# Patient Record
Sex: MALE | Race: WHITE | NOT HISPANIC OR LATINO | URBAN - METROPOLITAN AREA
[De-identification: names, ages, dates, MRNs, and addresses within clinical notes are randomized per-mention and may not be internally consistent; named-entity substitution may affect disease eponyms.]

---

## 2017-05-30 ENCOUNTER — IMPORTED ENCOUNTER (OUTPATIENT)
Dept: URBAN - METROPOLITAN AREA CLINIC 1 | Facility: CLINIC | Age: 82
End: 2017-05-30

## 2017-05-30 PROBLEM — H25.813: Noted: 2017-05-30

## 2017-05-30 PROBLEM — H40.1131: Noted: 2017-05-30

## 2017-05-30 PROCEDURE — 92133 CPTRZD OPH DX IMG PST SGM ON: CPT

## 2017-05-30 PROCEDURE — 92012 INTRM OPH EXAM EST PATIENT: CPT

## 2017-05-30 NOTE — PATIENT DISCUSSION
1.  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 OCT shows no progression OU. Patient to continue with Latanoprost OU QHS. Patient advised to be compliant with gtts. 2.  Cataract OU:  Visually Significant secondary to glare discussed the risks benefits alternatives and limitations of cataract surgery. The patient stated a full understanding and a desire to proceed with the procedure. The patient will need to return for preop appointment with cataract measurements and to have any additional questions answered and start pre-operative eye drops as directed. Phaco PCL OS then OD. (Otherwise follow-up 6 mo 30 VF 24-2 OU) 3. H/o Epiretinal Membrane OD 4. H/o DM w/o DR OU Return for an appointment with Dr. Andrew David for AS/HP.

## 2017-06-27 ENCOUNTER — IMPORTED ENCOUNTER (OUTPATIENT)
Dept: URBAN - METROPOLITAN AREA CLINIC 1 | Facility: CLINIC | Age: 82
End: 2017-06-27

## 2017-06-27 PROBLEM — H25.812: Noted: 2017-06-27

## 2017-06-27 PROCEDURE — 92136 OPHTHALMIC BIOMETRY: CPT

## 2017-06-27 NOTE — PATIENT DISCUSSION
Cataract OS: Visually Significant secondary to glare discussed the risks benefits alternatives and limitations of cataract surgery. The patient stated a full understanding and a desire to proceed with the procedure. The patient will need to start pre-operative eye drops as directed. Proceed w/ phaco PCL OS w/ I stentPt understands they will need glasses post-op to achieve their best corrected vision.  Return as scheduled w/ PMG for sx OS

## 2017-07-05 ENCOUNTER — IMPORTED ENCOUNTER (OUTPATIENT)
Dept: URBAN - METROPOLITAN AREA CLINIC 1 | Facility: CLINIC | Age: 82
End: 2017-07-05

## 2017-07-06 ENCOUNTER — IMPORTED ENCOUNTER (OUTPATIENT)
Dept: URBAN - METROPOLITAN AREA CLINIC 1 | Facility: CLINIC | Age: 82
End: 2017-07-06

## 2017-07-06 PROBLEM — Z96.1: Noted: 2017-07-06

## 2017-07-06 PROCEDURE — 99024 POSTOP FOLLOW-UP VISIT: CPT

## 2017-07-06 NOTE — PATIENT DISCUSSION
POD#1 CE/IOL OS-- Combined Phaco w/ I-Stent (Standard)  doing well. Continue all 3 gtts as prescribed and until gone. Use Lotemax BID OS Prolensa Qdaily OS Ocuflox TID OS Cont Latanoprost OU QHSPost op Warnings Reiterated RTC as scheduled

## 2017-07-11 ENCOUNTER — IMPORTED ENCOUNTER (OUTPATIENT)
Dept: URBAN - METROPOLITAN AREA CLINIC 1 | Facility: CLINIC | Age: 82
End: 2017-07-11

## 2017-07-11 PROBLEM — H25.811: Noted: 2017-07-11

## 2017-07-11 PROBLEM — Z96.1: Noted: 2017-07-11

## 2017-07-11 PROCEDURE — 92136 OPHTHALMIC BIOMETRY: CPT

## 2017-07-11 NOTE — PATIENT DISCUSSION
1.  Cataract OD: Visually Significant discussed the risks benefits alternatives and limitations of cataract surgery. The patient stated a full understanding and a desire to proceed with the procedure. The patient will need to start pre-operative eye drops as directed. Proceed w/ phaco PCL ODPt understands they will need glasses post-op to achieve their best corrected vision. 2.  POW#1  CE/IOL Standard w/ I-stent OS doing well. Discontinue OcufloxContinue Lotemax/Durezol/Prednisolone BID until gone. Continue Prolensa/Ilevro/Acular QD until gone. Continue Latanoprost OU QHS. 3.  RTC as scheduled for sx OD w/ PMG.

## 2017-07-19 ENCOUNTER — IMPORTED ENCOUNTER (OUTPATIENT)
Dept: URBAN - METROPOLITAN AREA CLINIC 1 | Facility: CLINIC | Age: 82
End: 2017-07-19

## 2017-07-20 ENCOUNTER — IMPORTED ENCOUNTER (OUTPATIENT)
Dept: URBAN - METROPOLITAN AREA CLINIC 1 | Facility: CLINIC | Age: 82
End: 2017-07-20

## 2017-07-20 PROBLEM — Z96.1: Noted: 2017-07-20

## 2017-07-20 PROCEDURE — 99024 POSTOP FOLLOW-UP VISIT: CPT

## 2017-07-20 NOTE — PATIENT DISCUSSION
1. POD#1 CE/IOL Standard w/ i-Stent OD doing well. Continue all 3 gtts as prescribed and until gone. Ocuflox TIDLotemax/Durezol/Prednisolone BIDProlensa/Ilevro/Acular QDPost op Warnings Reiterated  2. POW#2  CE/IOLStandard w/ i-Stent OS doing well. Continue Lotemax/Durezol/Prednisolone BID until gone. Continue Prolensa/Ilevro/Acular QD until gone. 3.   RTC as scheduled

## 2017-08-10 ENCOUNTER — IMPORTED ENCOUNTER (OUTPATIENT)
Dept: URBAN - METROPOLITAN AREA CLINIC 1 | Facility: CLINIC | Age: 82
End: 2017-08-10

## 2017-08-10 PROBLEM — Z96.1: Noted: 2017-08-10

## 2017-08-10 PROCEDURE — 99024 POSTOP FOLLOW-UP VISIT: CPT

## 2017-08-10 NOTE — PATIENT DISCUSSION
POW#3 Phaco/ PCL OU (Standard w/ i-Stent OU) doing well. Finish PO meds per schedule D/c Latanoprost OU. Will restart if IOP elevates significantly. MRX for glasses given Return for an appointment in Jan 30 Anaya Zuniga with Dr. Tierney Mendez.

## 2017-08-29 ENCOUNTER — IMPORTED ENCOUNTER (OUTPATIENT)
Dept: URBAN - METROPOLITAN AREA CLINIC 1 | Facility: CLINIC | Age: 82
End: 2017-08-29

## 2017-08-29 PROBLEM — H53.2: Noted: 2017-08-29

## 2017-08-29 PROCEDURE — 92012 INTRM OPH EXAM EST PATIENT: CPT

## 2017-08-29 NOTE — PATIENT DISCUSSION
1. Diplopia probable fatigue related phoria- Advised patient to get new MRX. Will re-evaluate if symptoms persist. 2. POW#4 Phaco/ PCL OU (Standard w/ i-Stent OU) doing well.  Finish PO meds per schedule F/u as scheduled

## 2018-01-11 ENCOUNTER — IMPORTED ENCOUNTER (OUTPATIENT)
Dept: URBAN - METROPOLITAN AREA CLINIC 1 | Facility: CLINIC | Age: 83
End: 2018-01-11

## 2018-01-11 PROBLEM — Z79.84: Noted: 2018-01-11

## 2018-01-11 PROBLEM — H35.371: Noted: 2018-01-11

## 2018-01-11 PROBLEM — H40.1131: Noted: 2018-01-11

## 2018-01-11 PROBLEM — Z96.1: Noted: 2018-01-11

## 2018-01-11 PROBLEM — H43.813: Noted: 2018-01-11

## 2018-01-11 PROBLEM — E11.9: Noted: 2018-01-11

## 2018-01-11 PROCEDURE — 92133 CPTRZD OPH DX IMG PST SGM ON: CPT

## 2018-01-11 PROCEDURE — 92014 COMPRE OPH EXAM EST PT 1/>: CPT

## 2018-01-11 NOTE — PATIENT DISCUSSION
1.  DM Type II (Oral Meds) without sign of diabetic retinopathy and no blot heme on dilated retinal examination today OU No Macular Edema:  Discussed the pathophysiology of diabetes and its effect on the eye and risk of blindness. Stressed the importance of strong glucose control. Advised of importance of at least yearly dilated examinations but to contact us immediately for any problems or concerns. 2. Epiretinal Membrane OD - Stable Observe for change. 3. 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 Stable IOP OU. OCT shows mild progression OD no progression OS. Patient to continue with Latanoprost OU QHS. Patient advised to be compliant with gtts. 4.  Pseudophakia OU - (Standard w/ iStent) 5. PVD OU - RD precautions. Letter to PCP Return for an appointment in 6 mo 10 Vf 24-2 OU with Dr. Rivka Hahn.

## 2018-04-04 ENCOUNTER — IMPORTED ENCOUNTER (OUTPATIENT)
Dept: URBAN - METROPOLITAN AREA CLINIC 1 | Facility: CLINIC | Age: 83
End: 2018-04-04

## 2018-04-04 PROBLEM — H43.813: Noted: 2018-04-04

## 2018-04-04 PROBLEM — Z79.84: Noted: 2018-04-04

## 2018-04-04 PROBLEM — H35.371: Noted: 2018-04-04

## 2018-04-04 PROBLEM — E11.9: Noted: 2018-04-04

## 2018-04-04 PROBLEM — Z96.1: Noted: 2018-04-04

## 2018-04-04 PROBLEM — Z79.899: Noted: 2018-04-04

## 2018-04-04 PROBLEM — H40.013: Noted: 2018-04-04

## 2018-04-04 PROCEDURE — 99213 OFFICE O/P EST LOW 20 MIN: CPT

## 2018-04-04 PROCEDURE — 92083 EXTENDED VISUAL FIELD XM: CPT

## 2018-04-04 NOTE — PATIENT DISCUSSION
1.   Plaquenil without evidence of Toxicity or Plaquenil retinopathy on High Risk Medication. [id number] Sara Davis for patient to remain on Plaquenil. VF was done results were high fixation loss OS. Normal OU 2.  DM Type II without sign of diabetic retinopathy and no blot heme on dilated retinal examination today OU No Macular Edema:  Discussed the pathophysiology of diabetes and its effect on the eye and risk of blindness. Stressed the importance of strong glucose control. Advised of importance of at least yearly dilated examinations but to contact us immediately for any problems or concerns. 3. Type II diabetes controlled by oral medications. 4.  Epiretinal Membrane OD - Observe for change. 5. COAG Suspect OU: Condition was discussed with patient. Will monitor patient for progression. Fundus photos were ordered today. 6. PVD w/o Tear OU - Patient was cautioned to call our office immediately if they experience   a substantial change in their symptoms such as an increase in floaters persistent flashes loss of visual field (may appear as a shadow or a curtain) or decrease in visual acuity as these may indicate a retinal tear or detachment. 7.  Pseudophakia OU - observe8. Return as scheduled with Dr. Tierney Mendez.  (schedule patient for Plaq VF at next visit if patient decides to start Plaq)

## 2018-07-12 ENCOUNTER — IMPORTED ENCOUNTER (OUTPATIENT)
Dept: URBAN - METROPOLITAN AREA CLINIC 1 | Facility: CLINIC | Age: 83
End: 2018-07-12

## 2018-07-12 PROBLEM — H40.1131: Noted: 2018-07-12

## 2018-07-12 PROCEDURE — 92083 EXTENDED VISUAL FIELD XM: CPT

## 2018-07-12 PROCEDURE — 99213 OFFICE O/P EST LOW 20 MIN: CPT

## 2018-07-12 NOTE — PATIENT DISCUSSION
1.  Mild Open Angle Glaucoma OU (CD 0.75/0.80) - HVF shows non specific defects OU likely artifacts. IOP stable off COAG gtts (Latanoprost). Will restart if IOP elevates. Patient advised to be compliant with gtts. Condition was discussed with patient and patient understands. Will continue to monitor patient for any progression in condition. Patient was advised to call us with any problems questions or concerns. 2.  H/o Epiretinal Membrane OD - Stable Observe for change. 3. Pseudophakia OU - (Standard w/ iStent) 4. H/o PVD OU - RD precautions. 5.  H/o DM w/o DR OU Return for an appointment in 6 months 30/OCT with Dr. Anthony Morgan.

## 2018-07-12 NOTE — PATIENT DISCUSSION
H/o Epiretinal Membrane OD - Stable Observe for change. 3. Pseudophakia OU - (Standard w/ iStent) 4. H/o PVD OU - RD precautions.  5.  H/o DM w/o DR GLOVER

## 2019-01-24 ENCOUNTER — IMPORTED ENCOUNTER (OUTPATIENT)
Dept: URBAN - METROPOLITAN AREA CLINIC 1 | Facility: CLINIC | Age: 84
End: 2019-01-24

## 2019-01-24 PROBLEM — Z79.84: Noted: 2019-01-24

## 2019-01-24 PROBLEM — H40.1131: Noted: 2019-01-24

## 2019-01-24 PROBLEM — E11.9: Noted: 2019-01-24

## 2019-01-24 PROCEDURE — 92133 CPTRZD OPH DX IMG PST SGM ON: CPT

## 2019-01-24 PROCEDURE — 92014 COMPRE OPH EXAM EST PT 1/>: CPT

## 2019-01-24 NOTE — PATIENT DISCUSSION
1.  DM Type II (Oral Meds) without sign of diabetic retinopathy and no blot heme on dilated retinal examination today OU No Macular Edema:  Discussed the pathophysiology of diabetes and its effect on the eye and risk of blindness. Stressed the importance of strong glucose control. Advised of importance of at least yearly dilated examinations but to contact us immediately for any problems or concerns. 2. Mild Open Angle Glaucoma OU (CD 0.75/0.8)- H/o iStent OU. Stable IOP OU. OCT no progression OU. Patient to continue with Latanoprost OU QHS. Patient advised to be compliant with gtts. 3.  Epiretinal Membrane OD - Stable Observe for change. 4. Pseudophakia OU - (Standard w/ iStent OU) 5. PVD OU - RD precautions. Letter to PCP Return for an appointment in 6 mo 10 VF 24-2 OU with Dr. Tuan Matos.

## 2019-07-16 ENCOUNTER — IMPORTED ENCOUNTER (OUTPATIENT)
Dept: URBAN - METROPOLITAN AREA CLINIC 1 | Facility: CLINIC | Age: 84
End: 2019-07-16

## 2019-07-16 PROBLEM — H26.493: Noted: 2019-07-16

## 2019-07-16 PROBLEM — Z96.1: Noted: 2019-07-16

## 2019-07-16 PROBLEM — H40.1131: Noted: 2019-07-16

## 2019-07-16 PROCEDURE — 99213 OFFICE O/P EST LOW 20 MIN: CPT

## 2019-07-16 PROCEDURE — 92083 EXTENDED VISUAL FIELD XM: CPT

## 2019-07-16 NOTE — PATIENT DISCUSSION
1.  Mild Open Angle Glaucoma OU (CD 0.75/0.80) - HVF WNL OU. IOP increased due to noncompliance. Restart Latanoprost QHS OU (erx). Patient advised to be compliant with gtts. Condition was discussed with patient and patient understands. Will continue to monitor patient for any progression in condition. Patient was advised to call us with any problems questions or concerns. 2.  PCO OU: (Posterior Capsule Opacification)   Observe and consider yag cap when pt feels pco visually significant and visual acuity decreases to appropriate level. 3. Pseudophakia OU - (Standard w/ iStent OU) 4. H/o Epiretinal Membrane OD - Stable Observe for change. 5. H/o PVD OU - RD precautions. 6.  H/o DM w/o DR Rebecca Ricardo for an appointment in 6 months 30/OCT with Dr. Jeffery Mixon.

## 2020-01-13 ENCOUNTER — IMPORTED ENCOUNTER (OUTPATIENT)
Dept: URBAN - METROPOLITAN AREA CLINIC 1 | Facility: CLINIC | Age: 85
End: 2020-01-13

## 2020-01-13 PROBLEM — Z79.84: Noted: 2020-01-13

## 2020-01-13 PROBLEM — E11.9: Noted: 2020-01-13

## 2020-01-13 PROBLEM — H40.023: Noted: 2020-01-13

## 2020-01-13 PROBLEM — H40.1131: Noted: 2020-01-13

## 2020-01-13 PROBLEM — E11.3291: Noted: 2020-01-13

## 2020-01-13 PROCEDURE — 92133 CPTRZD OPH DX IMG PST SGM ON: CPT

## 2020-01-13 PROCEDURE — 92014 COMPRE OPH EXAM EST PT 1/>: CPT

## 2020-01-13 NOTE — PATIENT DISCUSSION
1.  DM Type II (Oral Meds) with Mild Nonproliferative Diabetic Retinopathy OD No Macular Edema:  Discussed the pathophysiology of diabetes and its effect on the eye and risk of blindness. Stressed the importance of strong glucose control. Advised of importance of at least yearly dilated examinations but to contact us immediately for any problems or concerns. 2.  DM Type II (Oral Meds) without sign of diabetic retinopathy and no blot heme on dilated retinal examination today OS No Macular Edema:  Discussed the pathophysiology of diabetes and its effect on the eye and risk of blindness. Stressed the importance of strong glucose control. Advised of importance of at least yearly dilated examinations but to contact us immediately for any problems or concerns. 3. Mild Open Angle Glaucoma OU (CD 0.75/0.80) IOP stable on Latanoprost QHS OU. OCT shows no progression OU. Cont Latanoprost QHS OU. Compliance urged. 4.  PCO OU: (Posterior Capsule Opacification)  Observe. 5.  Pseudophakia OU - (Standard w/ iStent OU) 6.  Epiretinal Membrane OD - Stable Observe for change. 7. PVD OU - Stable RD precautions. Letter to PCP MRx deferredReturn for an appointment in 6 mo 10 dfe VF 24-2 OU with Dr. Shavonne Sigala.

## 2020-07-14 ENCOUNTER — IMPORTED ENCOUNTER (OUTPATIENT)
Dept: URBAN - METROPOLITAN AREA CLINIC 1 | Facility: CLINIC | Age: 85
End: 2020-07-14

## 2020-07-14 PROBLEM — Z79.84: Noted: 2020-07-14

## 2020-07-14 PROBLEM — E11.3291: Noted: 2020-07-14

## 2020-07-14 PROBLEM — H40.1131: Noted: 2020-07-14

## 2020-07-14 PROCEDURE — 92012 INTRM OPH EXAM EST PATIENT: CPT

## 2020-07-14 PROCEDURE — 92083 EXTENDED VISUAL FIELD XM: CPT

## 2020-07-14 NOTE — PATIENT DISCUSSION
1.  DM Type II (Oral Meds) with Mild Nonproliferative Diabetic Retinopathy OD No Macular Edema:  Discussed the pathophysiology of diabetes and its effect on the eye and risk of blindness. Stressed the importance of strong glucose control. Advised of importance of at least yearly dilated examinations but to contact us immediately for any problems or concerns. 2.  DM Type II (Oral Meds) without sign of diabetic retinopathy and no blot heme on dilated retinal examination today OS No Macular Edema:  Discussed the pathophysiology of diabetes and its effect on the eye and risk of blindness. Stressed the importance of strong glucose control. Advised of importance of at least yearly dilated examinations but to contact us immediately for any problems or concerns. 3. Mild Open Angle Glaucoma OU (CD 0.75/0.80) - HVF shows early superior and inferior arcuate OU. IOP stable Cont Latanoprost QHS OU. Compliance urged. 4.  PCO OU: (Posterior Capsule Opacification)  Observe. 5.  Pseudophakia OU - (Standard w/ iStent OU) 6.  Epiretinal Membrane OD - Stable Observe for change. 7. PVD OU - Stable RD precautions. Return for an appointment in 6 months 30/OCT with Dr. Andrew David.

## 2021-01-22 NOTE — PATIENT DISCUSSION
Patient's primary complaint is associated w/ chronic blepharitis involving all four eyelids. Recommend warm compresses and lid scrubs w/ baby shampoo daily.

## 2021-01-22 NOTE — PATIENT DISCUSSION
Prescribed Azasite eye drops twice daily for two days and once daily for two weeks to help with the current level of inflammation. Follow up if symptoms worsen.

## 2021-01-26 ENCOUNTER — IMPORTED ENCOUNTER (OUTPATIENT)
Dept: URBAN - METROPOLITAN AREA CLINIC 1 | Facility: CLINIC | Age: 86
End: 2021-01-26

## 2021-01-26 PROBLEM — E11.3291: Noted: 2021-01-26

## 2021-01-26 PROBLEM — Z79.84: Noted: 2021-01-26

## 2021-01-26 PROBLEM — E11.9: Noted: 2021-01-26

## 2021-01-26 PROBLEM — H40.1131: Noted: 2021-01-26

## 2021-01-26 PROCEDURE — 92014 COMPRE OPH EXAM EST PT 1/>: CPT

## 2021-01-26 PROCEDURE — 92133 CPTRZD OPH DX IMG PST SGM ON: CPT

## 2021-01-26 PROCEDURE — 92015 DETERMINE REFRACTIVE STATE: CPT

## 2021-01-26 NOTE — PATIENT DISCUSSION
1.  DM Type II (Oral Medication) with Mild Nonproliferative Diabetic Retinopathy OD No Macular Edema:  Discussed the pathophysiology of diabetes and its effect on the eye and risk of blindness. Stressed the importance of strong glucose control. Advised of importance of at least yearly dilated examinations but to contact us immediately for any problems or concerns. 2.  DM Type II (Oral Medication) without sign of diabetic retinopathy and no blot heme on dilated retinal examination today OS No Macular Edema:  Discussed the pathophysiology of diabetes and its effect on the eye and risk of blindness. Stressed the importance of strong glucose control. Advised of importance of at least yearly dilated examinations but to contact us immediately for any problems or concerns. 3. Mild Open Angle Glaucoma OU (CD 0.80 OU) - No progression by OCT. IOP stable continue Latanoprost QHS OU. Patient advised to be compliant with gtts. Condition was discussed with patient and patient understands. Will continue to monitor patient for any progression in condition. Patient was advised to call us with any problems questions or concerns. 4.  PCO OU: (Posterior Capsule Opacification)  Observe. 5.  Pseudophakia OU - (Standard w/ iStent OU) 6.  Epiretinal Membrane OD - Stable Observe for change. 7. PVD OU - Stable RD precautions. MRX for glasses given. Return for an appointment in 6 months DFE/HVF/glare with Dr. Shalonda Dacosta.

## 2021-08-02 ENCOUNTER — IMPORTED ENCOUNTER (OUTPATIENT)
Dept: URBAN - METROPOLITAN AREA CLINIC 1 | Facility: CLINIC | Age: 86
End: 2021-08-02

## 2021-08-02 PROBLEM — E11.3291: Noted: 2021-08-02

## 2021-08-02 PROBLEM — H40.1132: Noted: 2021-08-02

## 2021-08-02 PROBLEM — H40.1131: Noted: 2021-08-02

## 2021-08-02 PROCEDURE — 99213 OFFICE O/P EST LOW 20 MIN: CPT

## 2021-08-02 PROCEDURE — 92083 EXTENDED VISUAL FIELD XM: CPT

## 2021-08-02 NOTE — PATIENT DISCUSSION
1.  DM Type II (Oral Medication) with Mild Nonproliferative Diabetic Retinopathy OD No Macular Edema:  Discussed the pathophysiology of diabetes and its effect on the eye and risk of blindness. Stressed the importance of strong glucose control. Advised of importance of at least yearly dilated examinations but to contact us immediately for any problems or concerns. 2.  DM Type II (Oral Medication) without sign of diabetic retinopathy and no blot heme on dilated retinal examination today OS No Macular Edema:  Discussed the pathophysiology of diabetes and its effect on the eye and risk of blindness. Stressed the importance of strong glucose control. Advised of importance of at least yearly dilated examinations but to contact us immediately for any problems or concerns. 3. Mild Open Angle Glaucoma OU (CD 0.80 OU) -- HVF today non specific defects OU essentially normal. IOP stable continue Latanoprost QHS OU. Patient advised to be compliant with gtts. Condition was discussed with patient and patient understands. Will continue to monitor patient for any progression in condition. Patient was advised to call us with any problems questions or concerns. 4.  PCO OU -- (Posterior Capsule Opacification)  Observe. 5.  Pseudophakia OU -- (Standard w/ iStent OU) 6.  Epiretinal Membrane OD -- Stable Observe for change. 7. PVD OU -- Stable RD precautions. Return for an appointment in 6 months 30/OCT/glare with Dr. Tierney Mendez.

## 2022-02-03 ENCOUNTER — IMPORTED ENCOUNTER (OUTPATIENT)
Dept: URBAN - METROPOLITAN AREA CLINIC 1 | Facility: CLINIC | Age: 87
End: 2022-02-03

## 2022-02-03 PROBLEM — R73.09: Noted: 2022-02-03

## 2022-02-03 PROBLEM — H35.371: Noted: 2022-02-03

## 2022-02-03 PROBLEM — H40.1131: Noted: 2022-02-03

## 2022-02-03 PROBLEM — E11.3291: Noted: 2022-02-03

## 2022-02-03 PROBLEM — H26.493: Noted: 2022-02-03

## 2022-02-03 PROCEDURE — 92133 CPTRZD OPH DX IMG PST SGM ON: CPT

## 2022-02-03 PROCEDURE — 92015 DETERMINE REFRACTIVE STATE: CPT

## 2022-02-03 PROCEDURE — 99214 OFFICE O/P EST MOD 30 MIN: CPT

## 2022-02-03 NOTE — PATIENT DISCUSSION
1.  DM Type II (Diet Controlled) with Mild Nonproliferative Diabetic Retinopathy OD No Macular Edema:  Discussed the pathophysiology of diabetes and its effect on the eye and risk of blindness. Stressed the importance of strong glucose control. Advised of importance of at least yearly dilated examinations but to contact us immediately for any problems or concerns. 2.  DM Type II (Diet Controlled) without sign of diabetic retinopathy and no blot heme on dilated retinal examination today OS No Macular Edema:  Discussed the pathophysiology of diabetes and its effect on the eye and risk of blindness. Stressed the importance of strong glucose control. Advised of importance of at least yearly dilated examinations but to contact us immediately for any problems or concerns. 3. Mild Open Angle Glaucoma OU (CD 0.80 OU) -- OCT without progression OU. IOP stable continue Latanoprost QHS OU. Patient advised to be compliant with gtts. Condition was discussed with patient and patient understands. Will continue to monitor patient for any progression in condition. Patient was advised to call us with any problems questions or concerns. 4.  PCO OU -- Visually Significant *secondary to glare*; schedule YAG Cap. Pros cons risks and benefits. 5.  Pseudophakia OU -- (Standard w/ iStent OU) 6.  Epiretinal Membrane OD -- Stable Observe for change. 7. PVD OU -- Stable RD precautions. Return for an appointment in YAG Cap OD then OS with Dr. Jimy Bobo.

## 2022-02-03 NOTE — PATIENT DISCUSSION
DM Type II (Diet Controlled) without sign of diabetic retinopathy and no blot heme on dilated retinal examination today OS No Macular Edema:  Discussed the pathophysiology of diabetes and its effect on the eye and risk of blindness. Stressed the importance of strong glucose control. Advised of importance of at least yearly dilated examinations but to contact us immediately for any problems or concerns.

## 2022-03-04 ENCOUNTER — CLINIC PROCEDURE ONLY (OUTPATIENT)
Dept: URBAN - METROPOLITAN AREA CLINIC 1 | Facility: CLINIC | Age: 87
End: 2022-03-04

## 2022-03-04 DIAGNOSIS — H26.491: ICD-10-CM

## 2022-03-04 PROCEDURE — 66821 AFTER CATARACT LASER SURGERY: CPT

## 2022-03-04 NOTE — PROCEDURE NOTE: CLINICAL
PROCEDURE NOTE: YAG Capsulotomy OD. Diagnosis: Other Secondary Cataract. Anesthesia: Topical. The purpose and nature of the procedure, possible alternative methods of treatment, the risks involved and the possibility of complications were discussed with patient. The Patient wishes to proceed and the consent was signed. 1 gtt Prolensa applied. The laser was then performed under topical anesthesia with no complications. Post op instructions were given to patient as well as a follow-up appointment. Patient was advised to call our office if any questions or concerns. Yang Hayes

## 2022-03-18 ENCOUNTER — CLINIC PROCEDURE ONLY (OUTPATIENT)
Dept: URBAN - METROPOLITAN AREA CLINIC 1 | Facility: CLINIC | Age: 87
End: 2022-03-18

## 2022-03-18 DIAGNOSIS — H26.492: ICD-10-CM

## 2022-03-18 PROBLEM — H40.1111 PRIMARY OPEN ANGLE GLAUCOMA OF RIGHT EYE, MILD STAGE: Status: ACTIVE | Noted: 2017-07-19

## 2022-03-18 PROCEDURE — 66821 AFTER CATARACT LASER SURGERY: CPT

## 2022-03-18 NOTE — PROCEDURE NOTE: CLINICAL
PROCEDURE NOTE: YAG Capsulotomy OS. Diagnosis: Posterior Capsular Opacity. Anesthesia: Topical. The purpose and nature of the procedure, possible alternative methods of treatment, the risks involved and the possibility of complications were discussed with patient. The Patient wishes to proceed and the consent was signed. 1 gtt Prolensa applied. The laser was then performed under topical anesthesia with no complications. Post op instructions were given to patient as well as a follow-up appointment. Patient was advised to call our office if any questions or concerns. Kavita Marrero

## 2022-04-02 ASSESSMENT — TONOMETRY
OS_IOP_MMHG: 15
OD_IOP_MMHG: 18
OS_IOP_MMHG: 15
OS_IOP_MMHG: 19
OS_IOP_MMHG: 15
OD_IOP_MMHG: 16
OS_IOP_MMHG: 21
OS_IOP_MMHG: 16
OS_IOP_MMHG: 14
OD_IOP_MMHG: 16
OS_IOP_MMHG: 17
OD_IOP_MMHG: 18
OS_IOP_MMHG: 18
OS_IOP_MMHG: 20
OD_IOP_MMHG: 16
OS_IOP_MMHG: 14
OD_IOP_MMHG: 14
OS_IOP_MMHG: 16
OS_IOP_MMHG: 13
OD_IOP_MMHG: 16
OD_IOP_MMHG: 15
OD_IOP_MMHG: 20
OS_IOP_MMHG: 16
OD_IOP_MMHG: 17
OD_IOP_MMHG: 12
OS_IOP_MMHG: 21
OD_IOP_MMHG: 13
OS_IOP_MMHG: 17
OD_IOP_MMHG: 13
OD_IOP_MMHG: 14

## 2022-04-02 ASSESSMENT — VISUAL ACUITY
OD_CC: 20/40
OS_GLARE: 20/400
OD_GLARE: 20/80
OD_SC: 20/20
OS_SC: 20/20
OS_CC: J1
OD_CC: 20/50-1
OS_GLARE: 20/80
OS_SC: 20/20-2
OS_SC: 20/20
OS_CC: 20/30+1
OD_SC: 20/20
OD_CC: J1
OD_SC: 20/20-1
OD_SC: 20/20
OS_SC: 20/20
OS_SC: 20/20
OD_SC: 20/25
OD_CC: J1+
OD_CC: J1
OS_CC: 20/60
OS_CC: J1
OS_GLARE: 20/80
OD_GLARE: 20/400
OS_CC: J1
OS_SC: 20/20
OS_SC: 20/20
OD_SC: 20/20
OD_GLARE: 20/80
OD_GLARE: 20/80
OS_GLARE: 20/80
OD_SC: 20/25
OS_CC: J1+
OD_CC: J1
OS_SC: 20/20
OS_SC: 20/20
OD_SC: 20/30
OD_SC: 20/25
OS_CC: J1
OD_CC: J1
OD_SC: 20/20
OS_GLARE: 20/80
OD_GLARE: 20/80
OS_PH: SC 20/30
OS_PH: SC 20/30
OS_CC: 20/50
OS_CC: J1
OD_CC: J1
OS_SC: 20/25
OS_SC: 20/20
OS_CC: 20/70-1
OD_SC: 20/20-1
OS_CC: 20/50
OS_SC: 20/20
OD_SC: 20/20
OD_CC: 20/30-2

## 2022-04-26 ENCOUNTER — POST-OP (OUTPATIENT)
Dept: URBAN - METROPOLITAN AREA CLINIC 1 | Facility: CLINIC | Age: 87
End: 2022-04-26

## 2022-04-26 DIAGNOSIS — Z98.890: ICD-10-CM

## 2022-04-26 PROCEDURE — 99024 POSTOP FOLLOW-UP VISIT: CPT

## 2022-04-26 ASSESSMENT — VISUAL ACUITY
OS_CC: 20/25
OD_CC: 20/25
OS_CC: J1
OD_CC: J1

## 2022-04-26 ASSESSMENT — TONOMETRY
OD_IOP_MMHG: 15
OS_IOP_MMHG: 15

## 2022-10-24 ENCOUNTER — FOLLOW UP (OUTPATIENT)
Dept: URBAN - METROPOLITAN AREA CLINIC 1 | Facility: CLINIC | Age: 87
End: 2022-10-24

## 2022-10-24 DIAGNOSIS — H40.1131: ICD-10-CM

## 2022-10-24 PROCEDURE — 99213 OFFICE O/P EST LOW 20 MIN: CPT

## 2022-10-24 PROCEDURE — 92083 EXTENDED VISUAL FIELD XM: CPT

## 2022-10-24 ASSESSMENT — VISUAL ACUITY
OD_CC: J1
OS_CC: J1
OS_CC: 20/25
OD_CC: 20/20-1

## 2022-10-24 ASSESSMENT — TONOMETRY
OD_IOP_MMHG: 14
OS_IOP_MMHG: 15

## 2022-10-24 NOTE — PATIENT DISCUSSION
C/D:0.8 OU, IOP stable at 14/15. HVF 24-2 performed today is WNL OU, no progression.  Patient advised to be compliant with gtts latanoprost QHS OU. Condition was discussed with patient and patient understands. Will continue to monitor patient for any progression in condition. Patient was advised to call us with any problems, questions, or concerns.

## 2023-04-20 ENCOUNTER — COMPREHENSIVE EXAM (OUTPATIENT)
Dept: URBAN - METROPOLITAN AREA CLINIC 1 | Facility: CLINIC | Age: 88
End: 2023-04-20

## 2023-04-20 DIAGNOSIS — E11.3293: ICD-10-CM

## 2023-04-20 DIAGNOSIS — H40.1131: ICD-10-CM

## 2023-04-20 DIAGNOSIS — H43.813: ICD-10-CM

## 2023-04-20 DIAGNOSIS — Z96.1: ICD-10-CM

## 2023-04-20 DIAGNOSIS — H35.371: ICD-10-CM

## 2023-04-20 PROCEDURE — 99214 OFFICE O/P EST MOD 30 MIN: CPT

## 2023-04-20 PROCEDURE — 92133 CPTRZD OPH DX IMG PST SGM ON: CPT

## 2023-04-20 ASSESSMENT — TONOMETRY
OS_IOP_MMHG: 15
OD_IOP_MMHG: 15

## 2023-04-20 ASSESSMENT — VISUAL ACUITY
OD_CC: 20/20
OS_CC: 20/25-1
OS_CC: J1
OD_CC: J1

## 2023-05-23 RX ORDER — SIMVASTATIN 10 MG
10 TABLET ORAL NIGHTLY
COMMUNITY

## 2023-05-23 RX ORDER — DIGOXIN 250 MCG
0.25 TABLET ORAL DAILY
COMMUNITY

## 2023-05-23 RX ORDER — WARFARIN SODIUM 5 MG/1
5 TABLET ORAL DAILY
COMMUNITY

## 2023-05-23 RX ORDER — ACETAMINOPHEN AND CODEINE PHOSPHATE 300; 30 MG/1; MG/1
1 TABLET ORAL EVERY 4 HOURS PRN
COMMUNITY
Start: 2018-01-03

## 2023-05-23 RX ORDER — IRBESARTAN AND HYDROCHLOROTHIAZIDE 150; 12.5 MG/1; MG/1
1 TABLET, FILM COATED ORAL DAILY
COMMUNITY